# Patient Record
(demographics unavailable — no encounter records)

---

## 2024-11-14 NOTE — HISTORY OF PRESENT ILLNESS
[FreeTextEntry1] : 28-year-old woman right-handed with a history of chronic migraines, here for follow-up visit, reports since starting Qulipta, milligrams once a day she is a significant improvement of her headaches.  No reported side effects, has about maybe 1 or 2 headaches a month sometimes can go couple weeks without any.  Rizatriptan 10 mg works very well to relieve her headaches when she has it.

## 2024-11-14 NOTE — DATA REVIEWED
[de-identified] :   MR Head No Cont             Final  No Documents Attached    	 EXAM: [id number] - MR BRAIN  - ORDERED BY: ROXIE MARMOLEJO   PROCEDURE DATE:  09/25/2024    INTERPRETATION:  CLINICAL STATEMENT: Worsening headache  TECHNIQUE: MRI of the brain was performed without gadolinium.  COMPARISON: MRI brain 3/13/2019.  FINDINGS: There is no abnormal T2 prolongation signal in the white matter.  There is no acute parenchymal hemorrhage, parenchymal mass, mass effect or midline shift. There is no extra-axial fluid collection.  There is no hydrocephalus.  There is no acute infarct. Pituitary gland measures 7 mm in height which is within normal limits  Partial opacification mastoid air cells. Minimal mucosal thickening ethmoid sinus  IMPRESSION: No acute intracranial hemorrhage or acute infarct.

## 2024-11-14 NOTE — DISCUSSION/SUMMARY
[FreeTextEntry1] : 28-year-old woman history of chronic migraines, very good response Qulipta 60 mg daily.  Also obtains significant relief with rizatriptan 10 mg. Reviewed and discussed treatment, no changes at this time. Advised patient to maintain headache diary. Return to office, 6 months.